# Patient Record
Sex: MALE | Race: WHITE | NOT HISPANIC OR LATINO | Employment: FULL TIME | URBAN - METROPOLITAN AREA
[De-identification: names, ages, dates, MRNs, and addresses within clinical notes are randomized per-mention and may not be internally consistent; named-entity substitution may affect disease eponyms.]

---

## 2017-03-07 DIAGNOSIS — R91.1 LUNG NODULE: Primary | ICD-10-CM

## 2017-04-24 ENCOUNTER — HOSPITAL ENCOUNTER (OUTPATIENT)
Dept: CT IMAGING | Facility: HOSPITAL | Age: 67
Discharge: HOME | End: 2017-04-24
Admitting: PHYSICIAN ASSISTANT

## 2017-04-24 DIAGNOSIS — R91.1 LUNG NODULE: ICD-10-CM

## 2017-04-24 DIAGNOSIS — R91.1 LUNG NODULE: Primary | ICD-10-CM

## 2017-04-24 PROCEDURE — 71250 CT THORAX DX C-: CPT

## 2018-05-09 ENCOUNTER — TELEPHONE (OUTPATIENT)
Dept: CARDIAC SURGERY | Facility: CLINIC | Age: 68
End: 2018-05-09

## 2023-04-18 ENCOUNTER — HOSPITAL ENCOUNTER (OUTPATIENT)
Age: 73
End: 2023-04-18
Payer: MEDICARE

## 2023-04-18 ENCOUNTER — HOSPITAL ENCOUNTER (OUTPATIENT)
Age: 73
Discharge: HOME | End: 2023-04-18
Payer: MEDICARE

## 2023-04-18 VITALS
OXYGEN SATURATION: 95 % | RESPIRATION RATE: 18 BRPM | TEMPERATURE: 98.6 F | HEART RATE: 100 BPM | SYSTOLIC BLOOD PRESSURE: 163 MMHG | DIASTOLIC BLOOD PRESSURE: 78 MMHG

## 2023-04-18 VITALS
RESPIRATION RATE: 17 BRPM | HEART RATE: 86 BPM | TEMPERATURE: 99.14 F | DIASTOLIC BLOOD PRESSURE: 89 MMHG | OXYGEN SATURATION: 95 % | SYSTOLIC BLOOD PRESSURE: 147 MMHG

## 2023-04-18 VITALS
OXYGEN SATURATION: 95 % | HEART RATE: 86 BPM | SYSTOLIC BLOOD PRESSURE: 145 MMHG | TEMPERATURE: 98.42 F | RESPIRATION RATE: 16 BRPM | DIASTOLIC BLOOD PRESSURE: 86 MMHG

## 2023-04-18 VITALS
DIASTOLIC BLOOD PRESSURE: 80 MMHG | SYSTOLIC BLOOD PRESSURE: 149 MMHG | HEART RATE: 89 BPM | TEMPERATURE: 97.34 F | RESPIRATION RATE: 16 BRPM | OXYGEN SATURATION: 100 %

## 2023-04-18 VITALS
DIASTOLIC BLOOD PRESSURE: 84 MMHG | OXYGEN SATURATION: 93 % | SYSTOLIC BLOOD PRESSURE: 141 MMHG | HEART RATE: 85 BPM | RESPIRATION RATE: 15 BRPM

## 2023-04-18 VITALS
OXYGEN SATURATION: 97 % | HEART RATE: 88 BPM | DIASTOLIC BLOOD PRESSURE: 83 MMHG | RESPIRATION RATE: 16 BRPM | SYSTOLIC BLOOD PRESSURE: 150 MMHG

## 2023-04-18 VITALS
OXYGEN SATURATION: 100 % | DIASTOLIC BLOOD PRESSURE: 85 MMHG | RESPIRATION RATE: 16 BRPM | HEART RATE: 89 BPM | SYSTOLIC BLOOD PRESSURE: 148 MMHG

## 2023-04-18 VITALS
RESPIRATION RATE: 20 BRPM | SYSTOLIC BLOOD PRESSURE: 149 MMHG | DIASTOLIC BLOOD PRESSURE: 80 MMHG | TEMPERATURE: 97.4 F | HEART RATE: 88 BPM | OXYGEN SATURATION: 92 %

## 2023-04-18 VITALS — BODY MASS INDEX: 25.8 KG/M2

## 2023-04-18 DIAGNOSIS — K61.1: Primary | ICD-10-CM

## 2023-04-18 DIAGNOSIS — K61.0: Primary | ICD-10-CM

## 2023-04-18 DIAGNOSIS — Z79.899: ICD-10-CM

## 2023-04-18 DIAGNOSIS — Z72.0: ICD-10-CM

## 2023-04-18 LAB
ANION GAP SERPL CALC-SCNC: 13.6 MEQ/L (ref 5–15)
BUN SERPL-MCNC: 20 MG/DL (ref 9–20)
CALCIUM SPEC-MCNC: 9.2 MG/DL (ref 8.4–10.2)
CELLS COUNTED: 100
CHLORIDE SPEC-SCNC: 97 MMOL/L (ref 98–107)
CO2 SERPL-SCNC: 28 MMOL/L (ref 22–30)
CREAT BLD-SCNC: 1 MG/DL (ref 0.66–1.25)
ESTIMATED GLOMERULAR FILT RATE: 73 ML/MIN (ref 60–?)
GFR (AFRICAN AMERICAN): 89 ML/MIN (ref 60–?)
GLUCOSE: 88 MG/DL (ref 74–100)
HCT VFR BLD CALC: 46.2 % (ref 42–52)
HGB BLD-MCNC: 15.2 G/DL (ref 14.1–18)
MANUAL DIFFERENTIAL: (no result)
MCHC RBC-ENTMCNC: 33 G/DL (ref 31.8–35.4)
MCV RBC: 97.2 FL (ref 80–94)
MEAN CORPUSCULAR HEMOGLOBIN: 32.1 PG (ref 27–31.2)
PLATELET # BLD: 358 K/MM3 (ref 142–424)
POTASSIUM: 4.6 MMOL/L (ref 3.5–5.1)
RBC # BLD AUTO: 4.75 M/MM3 (ref 4.6–6.2)
SODIUM SPEC-SCNC: 134 MMOL/L (ref 136–145)
WBC # BLD AUTO: 18.9 K/MM3 (ref 4.8–10.8)

## 2023-04-18 PROCEDURE — 85025 COMPLETE CBC W/AUTO DIFF WBC: CPT

## 2023-04-18 PROCEDURE — 80048 BASIC METABOLIC PNL TOTAL CA: CPT

## 2023-04-18 PROCEDURE — 87186 SC STD MICRODIL/AGAR DIL: CPT

## 2023-04-18 PROCEDURE — 85007 BL SMEAR W/DIFF WBC COUNT: CPT

## 2023-04-18 PROCEDURE — 87075 CULTR BACTERIA EXCEPT BLOOD: CPT

## 2023-04-18 PROCEDURE — 87205 SMEAR GRAM STAIN: CPT

## 2023-04-18 PROCEDURE — 87070 CULTURE OTHR SPECIMN AEROBIC: CPT

## 2023-04-18 PROCEDURE — 36415 COLL VENOUS BLD VENIPUNCTURE: CPT

## 2023-04-18 PROCEDURE — 87077 CULTURE AEROBIC IDENTIFY: CPT

## 2023-04-18 PROCEDURE — 96374 THER/PROPH/DIAG INJ IV PUSH: CPT

## 2023-04-18 PROCEDURE — 46040 I&D ISCHIORCT&/PERIRCT ABSC: CPT

## 2023-04-19 ENCOUNTER — HOSPITAL ENCOUNTER (OUTPATIENT)
Dept: HOSPITAL 22 - INF | Age: 73
Discharge: HOME | End: 2023-04-19
Payer: MEDICARE

## 2023-04-19 DIAGNOSIS — K61.0: ICD-10-CM

## 2023-04-19 DIAGNOSIS — Z48.01: Primary | ICD-10-CM

## 2023-04-19 PROCEDURE — G0463 HOSPITAL OUTPT CLINIC VISIT: HCPCS

## 2023-04-20 ENCOUNTER — HOSPITAL ENCOUNTER (OUTPATIENT)
Dept: HOSPITAL 22 - INF | Age: 73
Discharge: HOME | End: 2023-04-20
Payer: MEDICARE

## 2023-04-20 VITALS — SYSTOLIC BLOOD PRESSURE: 150 MMHG | TEMPERATURE: 99.2 F | DIASTOLIC BLOOD PRESSURE: 83 MMHG | HEART RATE: 88 BPM

## 2023-04-20 DIAGNOSIS — Z48.01: ICD-10-CM

## 2023-04-20 DIAGNOSIS — K61.0: ICD-10-CM

## 2023-04-20 PROCEDURE — G0463 HOSPITAL OUTPT CLINIC VISIT: HCPCS

## 2023-04-21 ENCOUNTER — HOSPITAL ENCOUNTER (OUTPATIENT)
Dept: HOSPITAL 22 - INF | Age: 73
Discharge: HOME | End: 2023-04-21
Payer: MEDICARE

## 2023-04-21 DIAGNOSIS — K61.0: Primary | ICD-10-CM

## 2023-04-21 DIAGNOSIS — Z48.01: ICD-10-CM

## 2023-04-21 PROCEDURE — G0463 HOSPITAL OUTPT CLINIC VISIT: HCPCS

## 2023-04-22 ENCOUNTER — HOSPITAL ENCOUNTER (OUTPATIENT)
Age: 73
Discharge: HOME | End: 2023-04-22
Payer: MEDICARE

## 2023-04-22 VITALS — BODY MASS INDEX: 26.6 KG/M2

## 2023-04-22 DIAGNOSIS — K61.0: Primary | ICD-10-CM

## 2023-04-22 DIAGNOSIS — Z48.01: ICD-10-CM

## 2023-04-22 PROCEDURE — G0463 HOSPITAL OUTPT CLINIC VISIT: HCPCS

## 2023-04-23 ENCOUNTER — HOSPITAL ENCOUNTER (OUTPATIENT)
Dept: HOSPITAL 22 - INF | Age: 73
Discharge: HOME | End: 2023-04-23
Payer: MEDICARE

## 2023-04-23 VITALS
HEART RATE: 98 BPM | DIASTOLIC BLOOD PRESSURE: 77 MMHG | RESPIRATION RATE: 16 BRPM | OXYGEN SATURATION: 98 % | SYSTOLIC BLOOD PRESSURE: 139 MMHG | TEMPERATURE: 97.52 F

## 2023-04-23 VITALS — BODY MASS INDEX: 26.6 KG/M2

## 2023-04-23 DIAGNOSIS — Z48.01: ICD-10-CM

## 2023-04-23 DIAGNOSIS — K61.0: Primary | ICD-10-CM

## 2023-04-23 PROCEDURE — G0463 HOSPITAL OUTPT CLINIC VISIT: HCPCS

## 2023-04-24 ENCOUNTER — HOSPITAL ENCOUNTER (OUTPATIENT)
Dept: HOSPITAL 22 - INF | Age: 73
Discharge: HOME | End: 2023-04-24
Payer: MEDICARE

## 2023-04-24 DIAGNOSIS — Z48.01: ICD-10-CM

## 2023-04-24 DIAGNOSIS — K61.0: Primary | ICD-10-CM

## 2023-04-24 PROCEDURE — G0463 HOSPITAL OUTPT CLINIC VISIT: HCPCS

## 2023-04-25 ENCOUNTER — HOSPITAL ENCOUNTER (OUTPATIENT)
Dept: HOSPITAL 22 - INF | Age: 73
Discharge: HOME | End: 2023-04-25
Payer: MEDICARE

## 2023-04-25 DIAGNOSIS — Z48.01: ICD-10-CM

## 2023-04-25 DIAGNOSIS — K61.0: Primary | ICD-10-CM

## 2023-04-25 PROCEDURE — G0463 HOSPITAL OUTPT CLINIC VISIT: HCPCS

## 2023-04-26 ENCOUNTER — HOSPITAL ENCOUNTER (OUTPATIENT)
Dept: HOSPITAL 22 - INF | Age: 73
Discharge: HOME | End: 2023-04-26
Payer: MEDICARE

## 2023-04-26 DIAGNOSIS — Z48.01: ICD-10-CM

## 2023-04-26 DIAGNOSIS — K61.0: Primary | ICD-10-CM

## 2023-04-26 PROCEDURE — G0463 HOSPITAL OUTPT CLINIC VISIT: HCPCS

## 2023-04-28 ENCOUNTER — HOSPITAL ENCOUNTER (OUTPATIENT)
Dept: HOSPITAL 22 - INF | Age: 73
Discharge: HOME | End: 2023-04-28
Payer: MEDICARE

## 2023-04-28 DIAGNOSIS — K61.0: Primary | ICD-10-CM

## 2023-04-28 DIAGNOSIS — Z48.01: ICD-10-CM

## 2023-04-28 PROCEDURE — G0463 HOSPITAL OUTPT CLINIC VISIT: HCPCS

## 2023-04-29 ENCOUNTER — HOSPITAL ENCOUNTER (OUTPATIENT)
Dept: HOSPITAL 22 - INF | Age: 73
Discharge: HOME | End: 2023-04-29
Payer: MEDICARE

## 2023-04-29 VITALS
HEART RATE: 82 BPM | RESPIRATION RATE: 20 BRPM | TEMPERATURE: 98.5 F | OXYGEN SATURATION: 97 % | SYSTOLIC BLOOD PRESSURE: 145 MMHG | DIASTOLIC BLOOD PRESSURE: 78 MMHG

## 2023-04-29 DIAGNOSIS — Z48.01: ICD-10-CM

## 2023-04-29 DIAGNOSIS — K61.0: Primary | ICD-10-CM

## 2023-04-29 PROCEDURE — G0463 HOSPITAL OUTPT CLINIC VISIT: HCPCS

## 2023-04-30 ENCOUNTER — HOSPITAL ENCOUNTER (OUTPATIENT)
Dept: HOSPITAL 22 - INF | Age: 73
Discharge: HOME | End: 2023-04-30
Payer: MEDICARE

## 2023-04-30 VITALS
DIASTOLIC BLOOD PRESSURE: 85 MMHG | HEART RATE: 82 BPM | RESPIRATION RATE: 20 BRPM | OXYGEN SATURATION: 96 % | SYSTOLIC BLOOD PRESSURE: 156 MMHG

## 2023-04-30 DIAGNOSIS — Z48.01: ICD-10-CM

## 2023-04-30 DIAGNOSIS — K61.0: Primary | ICD-10-CM

## 2023-04-30 PROCEDURE — G0463 HOSPITAL OUTPT CLINIC VISIT: HCPCS

## 2023-05-01 ENCOUNTER — HOSPITAL ENCOUNTER (OUTPATIENT)
Dept: HOSPITAL 22 - INF | Age: 73
Discharge: HOME | End: 2023-05-01
Payer: MEDICARE

## 2023-05-01 DIAGNOSIS — K61.0: Primary | ICD-10-CM

## 2023-05-01 DIAGNOSIS — Z48.01: ICD-10-CM

## 2023-05-01 PROCEDURE — G0463 HOSPITAL OUTPT CLINIC VISIT: HCPCS

## 2023-05-02 ENCOUNTER — HOSPITAL ENCOUNTER (OUTPATIENT)
Dept: HOSPITAL 22 - INF | Age: 73
Discharge: HOME | End: 2023-05-02
Payer: MEDICARE

## 2023-05-02 DIAGNOSIS — Z48.01: ICD-10-CM

## 2023-05-02 DIAGNOSIS — K61.0: Primary | ICD-10-CM

## 2023-05-02 PROCEDURE — G0463 HOSPITAL OUTPT CLINIC VISIT: HCPCS

## 2023-05-03 ENCOUNTER — HOSPITAL ENCOUNTER (OUTPATIENT)
Dept: HOSPITAL 22 - INF | Age: 73
Discharge: HOME | End: 2023-05-03
Payer: MEDICARE

## 2023-05-03 DIAGNOSIS — K61.0: Primary | ICD-10-CM

## 2023-05-03 DIAGNOSIS — Z48.01: ICD-10-CM

## 2023-05-03 PROCEDURE — G0463 HOSPITAL OUTPT CLINIC VISIT: HCPCS

## 2023-05-04 ENCOUNTER — HOSPITAL ENCOUNTER (OUTPATIENT)
Dept: HOSPITAL 22 - INF | Age: 73
Discharge: HOME | End: 2023-05-04
Payer: MEDICARE

## 2023-05-04 DIAGNOSIS — K61.1: Primary | ICD-10-CM

## 2023-05-04 DIAGNOSIS — Z48.01: ICD-10-CM

## 2023-05-04 PROCEDURE — G0463 HOSPITAL OUTPT CLINIC VISIT: HCPCS

## 2023-05-05 ENCOUNTER — HOSPITAL ENCOUNTER (OUTPATIENT)
Dept: HOSPITAL 22 - INF | Age: 73
Discharge: HOME | End: 2023-05-05
Payer: MEDICARE

## 2023-05-05 DIAGNOSIS — Z48.01: Primary | ICD-10-CM

## 2023-05-05 DIAGNOSIS — K61.0: ICD-10-CM

## 2023-05-05 PROCEDURE — G0463 HOSPITAL OUTPT CLINIC VISIT: HCPCS

## 2023-05-06 ENCOUNTER — HOSPITAL ENCOUNTER (OUTPATIENT)
Age: 73
End: 2023-05-06
Payer: MEDICARE

## 2023-05-06 VITALS
RESPIRATION RATE: 20 BRPM | TEMPERATURE: 98.1 F | SYSTOLIC BLOOD PRESSURE: 154 MMHG | OXYGEN SATURATION: 97 % | HEART RATE: 87 BPM | DIASTOLIC BLOOD PRESSURE: 87 MMHG

## 2023-05-06 DIAGNOSIS — Z48.01: Primary | ICD-10-CM

## 2023-05-06 DIAGNOSIS — K61.0: ICD-10-CM

## 2023-05-06 PROCEDURE — G0463 HOSPITAL OUTPT CLINIC VISIT: HCPCS

## 2023-05-07 ENCOUNTER — HOSPITAL ENCOUNTER (OUTPATIENT)
Age: 73
End: 2023-05-07
Payer: MEDICARE

## 2023-05-07 VITALS
SYSTOLIC BLOOD PRESSURE: 177 MMHG | TEMPERATURE: 98.1 F | HEART RATE: 90 BPM | RESPIRATION RATE: 20 BRPM | OXYGEN SATURATION: 92 % | DIASTOLIC BLOOD PRESSURE: 90 MMHG

## 2023-05-07 VITALS — BODY MASS INDEX: 26.6 KG/M2

## 2023-05-07 DIAGNOSIS — Z48.01: Primary | ICD-10-CM

## 2023-05-07 DIAGNOSIS — K61.0: ICD-10-CM

## 2023-05-07 PROCEDURE — G0463 HOSPITAL OUTPT CLINIC VISIT: HCPCS

## 2023-05-08 ENCOUNTER — HOSPITAL ENCOUNTER (OUTPATIENT)
Dept: HOSPITAL 22 - INF | Age: 73
Discharge: HOME | End: 2023-05-08
Payer: MEDICARE

## 2023-05-08 DIAGNOSIS — K61.0: ICD-10-CM

## 2023-05-08 DIAGNOSIS — Z48.01: Primary | ICD-10-CM

## 2023-05-08 PROCEDURE — G0463 HOSPITAL OUTPT CLINIC VISIT: HCPCS

## 2023-05-09 ENCOUNTER — HOSPITAL ENCOUNTER (OUTPATIENT)
Dept: HOSPITAL 22 - INF | Age: 73
Discharge: HOME | End: 2023-05-09
Payer: MEDICARE

## 2023-05-09 DIAGNOSIS — K61.0: ICD-10-CM

## 2023-05-09 DIAGNOSIS — Z48.01: Primary | ICD-10-CM

## 2023-05-09 PROCEDURE — G0463 HOSPITAL OUTPT CLINIC VISIT: HCPCS

## 2023-05-10 ENCOUNTER — HOSPITAL ENCOUNTER (OUTPATIENT)
Dept: HOSPITAL 22 - INF | Age: 73
Discharge: HOME | End: 2023-05-10
Payer: MEDICARE

## 2023-05-10 DIAGNOSIS — K61.0: Primary | ICD-10-CM

## 2023-05-10 DIAGNOSIS — Z48.01: ICD-10-CM

## 2023-05-10 PROCEDURE — G0463 HOSPITAL OUTPT CLINIC VISIT: HCPCS

## 2023-07-28 ENCOUNTER — HOSPITAL ENCOUNTER (OUTPATIENT)
Age: 73
Discharge: HOME | End: 2023-07-28
Payer: MEDICARE

## 2023-07-28 VITALS
SYSTOLIC BLOOD PRESSURE: 110 MMHG | TEMPERATURE: 97.88 F | HEART RATE: 58 BPM | OXYGEN SATURATION: 98 % | RESPIRATION RATE: 18 BRPM | DIASTOLIC BLOOD PRESSURE: 70 MMHG

## 2023-07-28 VITALS
DIASTOLIC BLOOD PRESSURE: 50 MMHG | RESPIRATION RATE: 16 BRPM | SYSTOLIC BLOOD PRESSURE: 104 MMHG | OXYGEN SATURATION: 96 % | HEART RATE: 58 BPM

## 2023-07-28 VITALS
OXYGEN SATURATION: 97 % | SYSTOLIC BLOOD PRESSURE: 113 MMHG | TEMPERATURE: 97.6 F | HEART RATE: 59 BPM | DIASTOLIC BLOOD PRESSURE: 60 MMHG | RESPIRATION RATE: 14 BRPM

## 2023-07-28 VITALS — BODY MASS INDEX: 25.8 KG/M2

## 2023-07-28 VITALS
RESPIRATION RATE: 16 BRPM | SYSTOLIC BLOOD PRESSURE: 122 MMHG | OXYGEN SATURATION: 97 % | DIASTOLIC BLOOD PRESSURE: 72 MMHG | HEART RATE: 58 BPM

## 2023-07-28 VITALS
OXYGEN SATURATION: 94 % | SYSTOLIC BLOOD PRESSURE: 167 MMHG | RESPIRATION RATE: 18 BRPM | HEART RATE: 89 BPM | TEMPERATURE: 98 F | DIASTOLIC BLOOD PRESSURE: 107 MMHG

## 2023-07-28 DIAGNOSIS — Z86.010: Primary | ICD-10-CM

## 2023-07-28 DIAGNOSIS — D12.0: ICD-10-CM

## 2023-07-28 DIAGNOSIS — D12.2: ICD-10-CM

## 2023-07-28 DIAGNOSIS — Z12.11: ICD-10-CM

## 2023-07-28 DIAGNOSIS — D12.5: ICD-10-CM

## 2023-07-28 DIAGNOSIS — D12.3: ICD-10-CM

## 2023-07-28 DIAGNOSIS — K57.30: ICD-10-CM

## 2023-07-28 PROCEDURE — 45385 COLONOSCOPY W/LESION REMOVAL: CPT

## 2023-07-28 PROCEDURE — 45380 COLONOSCOPY AND BIOPSY: CPT

## 2023-07-28 PROCEDURE — 0DJD8ZZ INSPECTION OF LOWER INTESTINAL TRACT, VIA NATURAL OR ARTIFICIAL OPENING ENDOSCOPIC: ICD-10-PCS | Performed by: SURGERY

## 2023-07-28 PROCEDURE — 88305 TISSUE EXAM BY PATHOLOGIST: CPT

## 2024-09-12 ENCOUNTER — HOSPITAL ENCOUNTER (OUTPATIENT)
Age: 74
Discharge: HOME | End: 2024-09-12
Payer: MEDICARE

## 2024-09-12 VITALS
HEART RATE: 56 BPM | SYSTOLIC BLOOD PRESSURE: 122 MMHG | DIASTOLIC BLOOD PRESSURE: 68 MMHG | OXYGEN SATURATION: 95 % | RESPIRATION RATE: 18 BRPM

## 2024-09-12 VITALS
HEART RATE: 72 BPM | OXYGEN SATURATION: 95 % | SYSTOLIC BLOOD PRESSURE: 136 MMHG | RESPIRATION RATE: 18 BRPM | DIASTOLIC BLOOD PRESSURE: 73 MMHG

## 2024-09-12 VITALS
DIASTOLIC BLOOD PRESSURE: 59 MMHG | HEART RATE: 81 BPM | SYSTOLIC BLOOD PRESSURE: 99 MMHG | RESPIRATION RATE: 18 BRPM | OXYGEN SATURATION: 96 %

## 2024-09-12 VITALS
RESPIRATION RATE: 18 BRPM | OXYGEN SATURATION: 94 % | SYSTOLIC BLOOD PRESSURE: 157 MMHG | DIASTOLIC BLOOD PRESSURE: 90 MMHG | HEART RATE: 81 BPM | TEMPERATURE: 96.98 F

## 2024-09-12 VITALS
RESPIRATION RATE: 18 BRPM | OXYGEN SATURATION: 96 % | TEMPERATURE: 97.88 F | SYSTOLIC BLOOD PRESSURE: 102 MMHG | DIASTOLIC BLOOD PRESSURE: 52 MMHG | HEART RATE: 71 BPM

## 2024-09-12 VITALS — OXYGEN SATURATION: 97 %

## 2024-09-12 VITALS — BODY MASS INDEX: 26.6 KG/M2

## 2024-09-12 DIAGNOSIS — D12.7: ICD-10-CM

## 2024-09-12 DIAGNOSIS — D12.4: ICD-10-CM

## 2024-09-12 DIAGNOSIS — Z12.11: Primary | ICD-10-CM

## 2024-09-12 DIAGNOSIS — Z86.010: ICD-10-CM

## 2024-09-12 DIAGNOSIS — K57.30: ICD-10-CM

## 2024-09-12 DIAGNOSIS — Z09: ICD-10-CM

## 2024-09-12 DIAGNOSIS — D12.3: ICD-10-CM

## 2024-09-12 PROCEDURE — 45380 COLONOSCOPY AND BIOPSY: CPT

## 2024-09-12 PROCEDURE — 0DJD8ZZ INSPECTION OF LOWER INTESTINAL TRACT, VIA NATURAL OR ARTIFICIAL OPENING ENDOSCOPIC: ICD-10-PCS | Performed by: SURGERY

## 2024-09-12 PROCEDURE — 45385 COLONOSCOPY W/LESION REMOVAL: CPT

## 2024-09-12 NOTE — EXP.ANES.CKL
[facility]
Disclaimer: 
The information contained in this section may have been updated after the patient was seen, as this information can be updated by other users.

Medical History (Reviewed 24 @ 07:44 by Davidson Beaulieu RN)

Arrhythmia
Skin cancer
COPD (chronic obstructive pulmonary disease)
Hemorrhoid
Skin cancer
History of inguinal hernia


Surgical History (Updated 24 @ 07:45 by Davidson Beaulieu RN)

History of surgical removal of lesion
History of incision and drainage
History of inguinal hernia repair
History of colonoscopy


Family History (Reviewed 23 @ 10:14 by RAMON Gerardo)
Other
 Family history of cancer



Social History (Reviewed 24 @ 07:44 by Davidson Beaulieu RN)
Smoking Status:  Current every day smoker tobacco type: cigarettes packs per day: 2 
alcohol intake:  current 
substance use type:  denies use 
current occupational status:  employed 
Travel in the last 8 weeks:  None 
household members:  other 
housing:  house 
lives independently:  Yes 
marital status:  single 
education level:  high school 
 service:  No 
caffeine:  Yes 
special willie needs:  No 
agree to transfusion:  No 
do you feel safe at home:  Yes 
victim of physical abuse:  No 
victim of emotional abuse:  No 
victim of sexual abuse:  No 
would you like helpful sources:  No 



[facility] Anesthesia Checklist
Patient Identification
Patient Identification: Arm Band, Family and Verbal (Name & )
Structural Data
Admitted From: Home
Planned Operative Procedure/s: Colonoscopy
Consent for Planned Operative Procedure(s) Verified: Yes
Verified Documents: History and Physical
NPO Status Verified
Time NPO: 06:00
Chart Verification
Results Verified: CBC and BMP
Additional verifications
Patient Pregnant: No
Anesthesia Reactions: No
Hx Blood Transfusions: No
Blood Transfusion Reaction: No
Previous Colonoscopy: Yes
Cardiovascular Assessment
Heart Sounds: S1 & S2
Pulse Rhythm: Irregular
Peripheral Edema: No
Airway Assessment
Mallampati Score:: Class II
C-Spine Mobility Assessed: Yes (FROM Demonstrated)
TMJ Mobility Assessed: Yes
Dentition: Poor Dentition (Many missing. Nothing loose per pt.)
Neurological Assessment
Level of Consciousness: Awake, Alert, Appropriate and Follows Commands
Hx Seizures: No
Numbness or tingling in extremities: No
Anesthesia Plan
Anesthesia Risk discussed: Yes
Anesthesia Plan: Verified
ASA Class: III
Anesthesia Type: MAC
Preoperative Comments
Pre-Operative Comments: Pt.'s ASA physical status III d/t age, heavy smoking 2ppd x 50+years, & arrhythmia.

## 2024-09-12 NOTE — HMH.SCOPE
Procedure:
Date: 09/12/24
Patient YOB: 1950

Procedure Performed:: 
Total colonoscopy to terminal ileum with multiple polypectomy
Indications:: 
Patient is a 74-year-old male from [facility].  Primary care provider is Yo Matthews MD.  After he had recovered and healed from that he stated he was due for a colonoscopy.  He did have a previous colonoscopy about 5 or 6 years ago in 
[place].  I performed colonoscopy on 7/28/2023.  He was found to have pandiverticulosis with a total of 14 polyps removed many of which appeared to be hyperplastic.  Most notable polyp was in the ascending colon which was removed in a piecemeal 
fashion using hot snare.  Pathology revealed at least 5 tubular adenomas and a sessile serrated adenoma in the cecum.  Given the large number of polyps and to rule out early recurrence of the polyp in the ascending colon recommendations were for 
follow-up colonoscopy in 1 year.
.

Performing Provider:: 
Joe Ladd MD
.


Referring Provider:: 
Yo Matthews MD
.

Sedation:: 
MAC sedation
.

Procedure:: 
Patient history was obtained and appropriate physical examination was performed.  Patient's medications and allergies were reviewed.  Informed consent was obtained after explaining the benefits, alternatives, and risks of the procedure including, 
but not limited to, bleeding, perforation, missed lesions, and adverse reaction to anesthesia medications. 

Patient was transported to endoscopy procedure room.  Patient was connected to monitoring devices.  Throughout the procedure the patient's blood pressure, pulse, and oxygen saturations were monitored continuously.  Patient identification and planned 
procedure were verified by the staff.

Patient was positioned in lateral decubitus position.  Digital anorectal exam was performed.   Variable stiffness Olympus colonoscope was inserted and advanced under direct visualization to the cecum.  Adequacy of the colonic preparation was noted.  
The colonoscope was advanced a short distance into the terminal ileum.  The colonoscope was then slowly withdrawn while carefully examining the color, texture, anatomy, and integrity of the mucosoa circumferentially.  Within the rectum retroflexion 
was performed.  Colonoscope was then withdrawn.

Impression:
In the right colon there was some liquid pasty stool.  This was ultimately able of the cleared with high-volume trans colonoscopic irrigation and suctioning.  There was evidence of some pandiverticulosis with significant sigmoid diverticuli.  In the 
distal transverse colon there was a sessile polyp removed with cold snare with residual tissue removed with biopsy forceps.  In the descending colon there was a tiny polyp removed with biopsy forceps.  Rectosigmoid there were numerous likely 
hyperplastic polyps which were sampled with biopsy forceps.  In the rectosigmoid region there was also a somewhat larger polyp which was removed with cold snare and sent as separate specimen rectosigmoid polyp #2.  He had internal prolapsing 
hemorrhoids.
.

Findings:: 
Liquid pasty stool adherent to the walls of the right colon, ultimately cleared with high-volume trans colonoscopic irrigation and suctioning
Pandiverticulosis with significant sigmoid diverticulosis
Polyps as noted above, mostly hyperplastic appearing
.

Recommendations:: 
Repeat colonoscopy pending pathology.  Likely 2 or 3 years.
Complications:: 
None immediately apparent
Estimated blood obtained (mL): 2

Colonoscopy Component
Colonoscopy Component
Was a colonoscopy performed during today's procedure?: Yes
Recommended follow up colonoscopy of at least 10 years?: No
If no, follow up colonoscopy recommended in ___ years?: 2-3
Reason for not recommending >/= 10 yr follow-up interval?: See above

## 2024-09-12 NOTE — P.PCN_ITS
Procedure:    
Date: 09/12/24    
Patient YOB: 1950    
    
Procedure Performed::     
Total colonoscopy to terminal ileum with multiple polypectomy    
Indications::     
Patient is a 74-year-old male from [facility].  Primary care provider is   
Yo Matthews MD.  After he had recovered and healed from that he stated he was   
due for a colonoscopy.  He did have a previous colonoscopy about 5 or 6 years   
ago in [place].  I performed colonoscopy on 7/28/2023.  He was found to have   
pandiverticulosis with a total of 14 polyps removed many of which appeared to be  
hyperplastic.  Most notable polyp was in the ascending colon which was removed   
in a piecemeal fashion using hot snare.  Pathology revealed at least 5 tubular   
adenomas and a sessile serrated adenoma in the cecum.  Given the large number of  
polyps and to rule out early recurrence of the polyp in the ascending colon   
recommendations were for follow-up colonoscopy in 1 year.    
.    
    
Performing Provider::     
Joe Ladd MD    
.    
    
    
Referring Provider::     
Yo Matthews MD    
.    
    
Sedation::     
MAC sedation    
.    
    
Procedure::     
Patient history was obtained and appropriate physical examination was performed.  
 Patient's medications and allergies were reviewed.  Informed consent was   
obtained after explaining the benefits, alternatives, and risks of the procedure  
including, but not limited to, bleeding, perforation, missed lesions, and   
adverse reaction to anesthesia medications.     
    
Patient was transported to endoscopy procedure room.  Patient was connected to   
monitoring devices.  Throughout the procedure the patient's blood pressure,   
pulse, and oxygen saturations were monitored continuously.  Patient   
identification and planned procedure were verified by the staff.    
    
Patient was positioned in lateral decubitus position.  Digital anorectal exam   
was performed.   Variable stiffness Olympus colonoscope was inserted and   
advanced under direct visualization to the cecum.  Adequacy of the colonic   
preparation was noted.  The colonoscope was advanced a short distance into the   
terminal ileum.  The colonoscope was then slowly withdrawn while carefully   
examining the color, texture, anatomy, and integrity of the mucosoa   
circumferentially.  Within the rectum retroflexion was performed.  Colonoscope   
was then withdrawn.    
    
Impression:    
In the right colon there was some liquid pasty stool.  This was ultimately able   
of the cleared with high-volume trans colonoscopic irrigation and suctioning.    
There was evidence of some pandiverticulosis with significant sigmoid   
diverticuli.  In the distal transverse colon there was a sessile polyp removed   
with cold snare with residual tissue removed with biopsy forceps.  In the   
descending colon there was a tiny polyp removed with biopsy forceps.    
Rectosigmoid there were numerous likely hyperplastic polyps which were sampled   
with biopsy forceps.  In the rectosigmoid region there was also a somewhat   
larger polyp which was removed with cold snare and sent as separate specimen   
rectosigmoid polyp #2.  He had internal prolapsing hemorrhoids.    
.    
    
Findings::     
Liquid pasty stool adherent to the walls of the right colon, ultimately cleared   
with high-volume trans colonoscopic irrigation and suctioning    
Pandiverticulosis with significant sigmoid diverticulosis    
Polyps as noted above, mostly hyperplastic appearing    
.    
    
Recommendations::     
Repeat colonoscopy pending pathology.  Likely 2 or 3 years.    
Complications::     
None immediately apparent    
Estimated blood obtained (mL): 2    
    
Colonoscopy Component    
Colonoscopy Component    
Was a colonoscopy performed during today's procedure?: Yes    
Recommended follow up colonoscopy of at least 10 years?: No    
If no, follow up colonoscopy recommended in ___ years?: 2-3    
Reason for not recommending >/= 10 yr follow-up interval?: See above

## 2024-09-12 NOTE — P.PNANES_ITS
[facility]    
Disclaimer:     
The information contained in this section may have been updated after the   
patient was seen, as this information can be updated by other users.    
    
Medical History (Reviewed 24 @ 07:44 by Davidson Beaulieu RN)    
    
Arrhythmia    
Skin cancer    
COPD (chronic obstructive pulmonary disease)    
Hemorrhoid    
Skin cancer    
History of inguinal hernia    
    
    
Surgical History (Updated 24 @ 07:45 by Davidson Beaulieu RN)    
    
History of surgical removal of lesion    
History of incision and drainage    
History of inguinal hernia repair    
History of colonoscopy    
    
    
Family History (Reviewed 23 @ 10:14 by RAMON Gerardo)    
Other   Family history of cancer    
    
    
    
Social History (Reviewed 24 @ 07:44 by Davidson Beaulieu RN)    
Smoking Status:  Current every day smoker tobacco type: cigarettes packs per   
day: 2     
alcohol intake:  current     
substance use type:  denies use     
current occupational status:  employed     
Travel in the last 8 weeks:  None     
household members:  other     
housing:  house     
lives independently:  Yes     
marital status:  single     
education level:  high school     
 service:  No     
caffeine:  Yes     
special willie needs:  No     
agree to transfusion:  No     
do you feel safe at home:  Yes     
victim of physical abuse:  No     
victim of emotional abuse:  No     
victim of sexual abuse:  No     
would you like helpful sources:  No     
    
    
    
[facility] Anesthesia Checklist    
Patient Identification    
Patient Identification: Arm Band, Family and Verbal (Name & )    
Structural Data    
Admitted From: Home    
Planned Operative Procedure/s: Colonoscopy    
Consent for Planned Operative Procedure(s) Verified: Yes    
Verified Documents: History and Physical    
NPO Status Verified    
Time NPO: 06:00    
Chart Verification    
Results Verified: CBC and BMP    
Additional verifications    
Patient Pregnant: No    
Anesthesia Reactions: No    
Hx Blood Transfusions: No    
Blood Transfusion Reaction: No    
Previous Colonoscopy: Yes    
Cardiovascular Assessment    
Heart Sounds: S1 & S2    
Pulse Rhythm: Irregular    
Peripheral Edema: No    
Airway Assessment    
Mallampati Score:: Class II    
C-Spine Mobility Assessed: Yes (FROM Demonstrated)    
TMJ Mobility Assessed: Yes    
Dentition: Poor Dentition (Many missing. Nothing loose per pt.)    
Neurological Assessment    
Level of Consciousness: Awake, Alert, Appropriate and Follows Commands    
Hx Seizures: No    
Numbness or tingling in extremities: No    
Anesthesia Plan    
Anesthesia Risk discussed: Yes    
Anesthesia Plan: Verified    
ASA Class: III    
Anesthesia Type: MAC    
Preoperative Comments    
Pre-Operative Comments: Pt.'s ASA physical status III d/t age, heavy smoking   
2ppd x 50+years, & arrhythmia.